# Patient Record
Sex: FEMALE | URBAN - METROPOLITAN AREA
[De-identification: names, ages, dates, MRNs, and addresses within clinical notes are randomized per-mention and may not be internally consistent; named-entity substitution may affect disease eponyms.]

---

## 2017-01-20 LAB
ABO + RH BLD: NORMAL
ABO + RH BLD: NORMAL
BLD GP AB SCN SERPL QL: NEGATIVE
HBV SURFACE AG SERPL QL IA: NEGATIVE
RUBELLA ABY IGG: 1.32
RUBELLA ANTIBODY IGG QUANTITATIVE: NORMAL IU/ML
T PALLIDUM IGG SER QL: NEGATIVE

## 2017-06-22 ENCOUNTER — CARE COORDINATION (OUTPATIENT)
Dept: CASE MANAGEMENT | Facility: CLINIC | Age: 37
End: 2017-06-22

## 2017-08-07 LAB — GROUP B STREP PCR: NEGATIVE

## 2017-08-21 ENCOUNTER — HOSPITAL ENCOUNTER (INPATIENT)
Facility: CLINIC | Age: 37
LOS: 3 days | Discharge: HOME | End: 2017-08-24
Attending: OBSTETRICS & GYNECOLOGY | Admitting: OBSTETRICS & GYNECOLOGY
Payer: MEDICAID

## 2017-08-21 LAB
A1 MICROGLOB PLACENTAL VAG QL: POSITIVE
ABO + RH BLD: NORMAL
ABO + RH BLD: NORMAL
GLUCOSE BLDC GLUCOMTR-MCNC: 74 MG/DL (ref 70–99)
GLUCOSE BLDC GLUCOMTR-MCNC: 83 MG/DL (ref 70–99)
GLUCOSE SERPL-MCNC: 73 MG/DL (ref 70–99)
SPECIMEN EXP DATE BLD: NORMAL

## 2017-08-21 PROCEDURE — 25000128 H RX IP 250 OP 636: Performed by: OBSTETRICS & GYNECOLOGY

## 2017-08-21 PROCEDURE — 25000132 ZZH RX MED GY IP 250 OP 250 PS 637: Performed by: OBSTETRICS & GYNECOLOGY

## 2017-08-21 PROCEDURE — 99215 OFFICE O/P EST HI 40 MIN: CPT | Mod: 25

## 2017-08-21 PROCEDURE — 86901 BLOOD TYPING SEROLOGIC RH(D): CPT | Performed by: OBSTETRICS & GYNECOLOGY

## 2017-08-21 PROCEDURE — 12000029 ZZH R&B OB INTERMEDIATE

## 2017-08-21 PROCEDURE — 25000125 ZZHC RX 250: Performed by: OBSTETRICS & GYNECOLOGY

## 2017-08-21 PROCEDURE — 3E033VJ INTRODUCTION OF OTHER HORMONE INTO PERIPHERAL VEIN, PERCUTANEOUS APPROACH: ICD-10-PCS | Performed by: OBSTETRICS & GYNECOLOGY

## 2017-08-21 PROCEDURE — 86780 TREPONEMA PALLIDUM: CPT | Performed by: OBSTETRICS & GYNECOLOGY

## 2017-08-21 PROCEDURE — 36415 COLL VENOUS BLD VENIPUNCTURE: CPT | Performed by: OBSTETRICS & GYNECOLOGY

## 2017-08-21 PROCEDURE — 82947 ASSAY GLUCOSE BLOOD QUANT: CPT | Performed by: OBSTETRICS & GYNECOLOGY

## 2017-08-21 PROCEDURE — 59025 FETAL NON-STRESS TEST: CPT

## 2017-08-21 PROCEDURE — 86900 BLOOD TYPING SEROLOGIC ABO: CPT | Performed by: OBSTETRICS & GYNECOLOGY

## 2017-08-21 PROCEDURE — 00000146 ZZHCL STATISTIC GLUCOSE BY METER IP

## 2017-08-21 PROCEDURE — 84112 EVAL AMNIOTIC FLUID PROTEIN: CPT | Performed by: OBSTETRICS & GYNECOLOGY

## 2017-08-21 RX ORDER — ACETAMINOPHEN 325 MG/1
650 TABLET ORAL EVERY 4 HOURS PRN
Status: DISCONTINUED | OUTPATIENT
Start: 2017-08-21 | End: 2017-08-22

## 2017-08-21 RX ORDER — NALOXONE HYDROCHLORIDE 0.4 MG/ML
.1-.4 INJECTION, SOLUTION INTRAMUSCULAR; INTRAVENOUS; SUBCUTANEOUS
Status: DISCONTINUED | OUTPATIENT
Start: 2017-08-21 | End: 2017-08-22

## 2017-08-21 RX ORDER — FENTANYL CITRATE 50 UG/ML
50-100 INJECTION, SOLUTION INTRAMUSCULAR; INTRAVENOUS
Status: DISCONTINUED | OUTPATIENT
Start: 2017-08-21 | End: 2017-08-22

## 2017-08-21 RX ORDER — PRENATAL VIT/IRON FUM/FOLIC AC 27MG-0.8MG
1 TABLET ORAL DAILY
COMMUNITY

## 2017-08-21 RX ORDER — OXYTOCIN 10 [USP'U]/ML
10 INJECTION, SOLUTION INTRAMUSCULAR; INTRAVENOUS
Status: DISCONTINUED | OUTPATIENT
Start: 2017-08-21 | End: 2017-08-22

## 2017-08-21 RX ORDER — DEXTROSE, SODIUM CHLORIDE, SODIUM LACTATE, POTASSIUM CHLORIDE, AND CALCIUM CHLORIDE 5; .6; .31; .03; .02 G/100ML; G/100ML; G/100ML; G/100ML; G/100ML
INJECTION, SOLUTION INTRAVENOUS CONTINUOUS
Status: DISCONTINUED | OUTPATIENT
Start: 2017-08-21 | End: 2017-08-22

## 2017-08-21 RX ORDER — METHYLERGONOVINE MALEATE 0.2 MG/ML
200 INJECTION INTRAVENOUS
Status: COMPLETED | OUTPATIENT
Start: 2017-08-21 | End: 2017-08-22

## 2017-08-21 RX ORDER — ONDANSETRON 2 MG/ML
4 INJECTION INTRAMUSCULAR; INTRAVENOUS EVERY 6 HOURS PRN
Status: DISCONTINUED | OUTPATIENT
Start: 2017-08-21 | End: 2017-08-22

## 2017-08-21 RX ORDER — NICOTINE POLACRILEX 4 MG
15-30 LOZENGE BUCCAL
Status: DISCONTINUED | OUTPATIENT
Start: 2017-08-21 | End: 2017-08-22

## 2017-08-21 RX ORDER — OXYCODONE AND ACETAMINOPHEN 5; 325 MG/1; MG/1
1 TABLET ORAL
Status: DISCONTINUED | OUTPATIENT
Start: 2017-08-21 | End: 2017-08-22

## 2017-08-21 RX ORDER — HYDROXYZINE HYDROCHLORIDE 50 MG/1
50-100 TABLET, FILM COATED ORAL EVERY 6 HOURS PRN
Status: DISCONTINUED | OUTPATIENT
Start: 2017-08-21 | End: 2017-08-22

## 2017-08-21 RX ORDER — SODIUM CHLORIDE 9 MG/ML
INJECTION, SOLUTION INTRAVENOUS CONTINUOUS
Status: DISCONTINUED | OUTPATIENT
Start: 2017-08-21 | End: 2017-08-22

## 2017-08-21 RX ORDER — OXYTOCIN/0.9 % SODIUM CHLORIDE 30/500 ML
1-24 PLASTIC BAG, INJECTION (ML) INTRAVENOUS CONTINUOUS
Status: DISCONTINUED | OUTPATIENT
Start: 2017-08-21 | End: 2017-08-22

## 2017-08-21 RX ORDER — HYDROXYZINE HYDROCHLORIDE 25 MG/1
25 TABLET, FILM COATED ORAL EVERY 6 HOURS PRN
Status: DISCONTINUED | OUTPATIENT
Start: 2017-08-21 | End: 2017-08-21

## 2017-08-21 RX ORDER — CARBOPROST TROMETHAMINE 250 UG/ML
250 INJECTION, SOLUTION INTRAMUSCULAR
Status: DISCONTINUED | OUTPATIENT
Start: 2017-08-21 | End: 2017-08-22

## 2017-08-21 RX ORDER — IBUPROFEN 800 MG/1
800 TABLET, FILM COATED ORAL
Status: DISCONTINUED | OUTPATIENT
Start: 2017-08-21 | End: 2017-08-22

## 2017-08-21 RX ORDER — OXYTOCIN/0.9 % SODIUM CHLORIDE 30/500 ML
100-340 PLASTIC BAG, INJECTION (ML) INTRAVENOUS CONTINUOUS PRN
Status: COMPLETED | OUTPATIENT
Start: 2017-08-21 | End: 2017-08-22

## 2017-08-21 RX ORDER — DEXTROSE MONOHYDRATE 25 G/50ML
25-50 INJECTION, SOLUTION INTRAVENOUS
Status: DISCONTINUED | OUTPATIENT
Start: 2017-08-21 | End: 2017-08-22

## 2017-08-21 RX ORDER — SODIUM CHLORIDE, SODIUM LACTATE, POTASSIUM CHLORIDE, CALCIUM CHLORIDE 600; 310; 30; 20 MG/100ML; MG/100ML; MG/100ML; MG/100ML
INJECTION, SOLUTION INTRAVENOUS CONTINUOUS
Status: DISCONTINUED | OUTPATIENT
Start: 2017-08-21 | End: 2017-08-22

## 2017-08-21 RX ADMIN — SODIUM CHLORIDE, POTASSIUM CHLORIDE, SODIUM LACTATE AND CALCIUM CHLORIDE: 600; 310; 30; 20 INJECTION, SOLUTION INTRAVENOUS at 12:51

## 2017-08-21 RX ADMIN — OXYTOCIN-SODIUM CHLORIDE 0.9% IV SOLN 30 UNIT/500ML 2 MILLI-UNITS/MIN: 30-0.9/5 SOLUTION at 12:52

## 2017-08-21 RX ADMIN — FENTANYL CITRATE 100 MCG: 50 INJECTION, SOLUTION INTRAMUSCULAR; INTRAVENOUS at 23:13

## 2017-08-21 RX ADMIN — HYDROXYZINE HYDROCHLORIDE 100 MG: 50 TABLET, FILM COATED ORAL at 21:50

## 2017-08-21 NOTE — PLAN OF CARE
1220-Dr. Flores at bedside and performed SVE, closed/60/-1. Updated on FHT's strip, moderate variability with accelerations. Discussed plan of care with Pt, plan is to begin oxytocin infusion to induce labor due to leaking membranes. 1252-Oxytocin infusion started. Will continue to monitor.

## 2017-08-21 NOTE — IP AVS SNAPSHOT
MRN:[id number]                      After Visit Summary   8/21/2017    Dot Rahman    MRN: [id number]           Thank you!     Thank you for choosing [facility] for your care. Our goal is always to provide you with excellent care. Hearing back from our patients is one way we can continue to improve our services. Please take a few minutes to complete the written survey that you may receive in the mail after you visit with us. Thank you!        Patient Information     Date Of Birth          1980        About your hospital stay     You were admitted on:  August 21, 2017 You last received care in the:  [address]    You were discharged on:  August 24, 2017        Reason for your hospital stay       Vaginal delivery                  Who to Call     For medical emergencies, please call 911.  For non-urgent questions about your medical care, please call your primary care provider or clinic, None          Attending Provider     Provider Specialty    León Flores MD OB/Gyn    Becky Calhoun MD OB/Gyn       Primary Care Provider    None Specified      After Care Instructions     Activity       Review discharge instructions            Activity       Your activity upon discharge: nothing per vagina x 6 weeks.            Diet       Resume previous diet            Diet       Follow this diet upon discharge: regular            Discharge Instructions           Discharge Instructions - Gestational diabetic patients       Gestational diabetic patients to follow up for fasting blood sugar and 2 hour 75gm glucose load at 6 weeks postpartum.            Discharge Instructions - Postpartum visit       Schedule postpartum visit with your provider and return to clinic in 6 weeks.                  Follow-up Appointments     Follow-up and recommended labs and tests        6 weeks postpartum, [facility]                  Further instructions from your care team        Postpartum Vaginal Delivery Instructions    Activity       Ask family and friends for help when you need it.    Do not place anything in your vagina for 6 weeks.    You are not restricted on other activities, but take it easy for a few weeks to allow your body to recover from delivery.  You are able to do any activities you feel up to that point.    No driving until you have stopped taking your pain medications (usually two weeks after delivery).     Call your health care provider if you have any of these symptoms:       Increased pain, swelling, redness, or fluid around your stiches from an episiotomy or perineal tear.    A fever above 100.4 F (38 C) with or without chills when placing a thermometer under your tongue.    You soak a sanitary pad with blood within 1 hour, or you see blood clots larger than a golf ball.    Bleeding that lasts more than 6 weeks.    Vaginal discharge that smells bad.    Severe pain, cramping or tenderness in your lower belly area.    A need to urinate more frequently (use the toilet more often), more urgently (use the toilet very quickly), or it burns when you urinate.    Nausea and vomiting.    Redness, swelling or pain around a vein in your leg.    Problems breastfeeding or a red or painful area on your breast.    Chest pain and cough or are gasping for air.    Problems coping with sadness, anxiety, or depression.  If you have any concerns about hurting yourself or the baby, call your provider immediately.     You have questions or concerns after you return home.     Keep your hands clean:  Always wash your hands before touching your perineal area and stitches.  This helps reduce your risk of infection.  If your hands aren't dirty, you may use an alcohol hand-rub to clean your hands. Keep your nails clean and short.        Pending Results     Date and Time Order Name Status Description    8/22/2017 2008 Placenta Path Order and Indications (PLACENTA) In process            "  Admission Information     Date & Time Provider Department Dept. Phone    2017 Becky Calhoun MD [address] [phone]      Your Vitals Were     Blood Pressure Pulse Temperature Respirations Height Weight    92/60 (BP Location: Right arm) 93 97.7  F (36.5  C) (Oral) 16 1.575 m (5' 2\") 73.5 kg (162 lb)    Pulse Oximetry BMI (Body Mass Index)                99% 29.63 kg/m2          [employer] Information     [employer] lets you send messages to your doctor, view your test results, renew your prescriptions, schedule appointments and more. To sign up, go to [email or web address] . Click on \"Log in\" on the left side of the screen, which will take you to the Welcome page. Then click on \"Sign up Now\" on the right side of the page.     You will be asked to enter the access code listed below, as well as some personal information. Please follow the directions to create your username and password.     Your access code is: [email or web address]  Expires: 2017  1:19 PM     Your access code will  in 90 days. If you need help or a new code, please call your [facility] clinic or [phone].        Care EveryWhere ID     This is your Care EveryWhere ID. This could be used by other organizations to access your [facility] medical records  [id number]        Equal Access to Services     JOCE MOELLER AH: Hadii cristian carvalho hadasho Sojosefali, waaxda luqadaha, qaybta kaalmada adeegyada, pipe lord . So [facility] [place] [phone].    ATENCIÓN: Si habla español, tiene a pena disposición servicios gratuitos de asistencia lingüística. Erwin manzano [phone].    We comply with applicable federal civil rights laws and [place] laws. We do not discriminate on the basis of race, color, national origin, age, disability sex, sexual orientation or gender identity.               Review of your medicines      START taking        Dose / Directions    acetaminophen 325 MG tablet   Commonly known as:  " TYLENOL        Dose:  650 mg   Take 2 tablets (650 mg) by mouth every 4 hours as needed for mild pain or fever (greater than or equal to 38? C /100.4? F (oral) or 38.5? C/ 101.4? F (core).)   Quantity:  100 tablet   Refills:  0       ibuprofen 400 MG tablet   Commonly known as:  ADVIL/MOTRIN        Dose:  400-800 mg   Take 1-2 tablets (400-800 mg) by mouth every 6 hours as needed for other (cramping)   Quantity:  120 tablet   Refills:  0       senna-docusate 8.6-50 MG per tablet   Commonly known as:  SENOKOT-S;PERICOLACE        Dose:  1-2 tablet   Take 1-2 tablets by mouth 2 times daily   Quantity:  100 tablet   Refills:  0         CONTINUE these medicines which have NOT CHANGED        Dose / Directions    prenatal multivitamin plus iron 27-0.8 MG Tabs per tablet        Dose:  1 tablet   Take 1 tablet by mouth daily   Refills:  0            Where to get your medicines      Some of these will need a paper prescription and others can be bought over the counter. Ask your nurse if you have questions.     You don't need a prescription for these medications     acetaminophen 325 MG tablet    ibuprofen 400 MG tablet    senna-docusate 8.6-50 MG per tablet                Protect others around you: Learn how to safely use, store and throw away your medicines at www.disposemymeds.org.             Medication List: This is a list of all your medications and when to take them. Check marks below indicate your daily home schedule. Keep this list as a reference.      Medications           Morning Afternoon Evening Bedtime As Needed    acetaminophen 325 MG tablet   Commonly known as:  TYLENOL   Take 2 tablets (650 mg) by mouth every 4 hours as needed for mild pain or fever (greater than or equal to 38? C /100.4? F (oral) or 38.5? C/ 101.4? F (core).)   Last time this was given:  650 mg on 8/24/2017  8:30 AM                                ibuprofen 400 MG tablet   Commonly known as:  ADVIL/MOTRIN   Take 1-2 tablets (400-800 mg) by  mouth every 6 hours as needed for other (cramping)   Last time this was given:  800 mg on 8/24/2017  8:30 AM                                prenatal multivitamin plus iron 27-0.8 MG Tabs per tablet   Take 1 tablet by mouth daily                                senna-docusate 8.6-50 MG per tablet   Commonly known as:  SENOKOT-S;PERICOLACE   Take 1-2 tablets by mouth 2 times daily   Last time this was given:  1 tablet on 8/24/2017  8:30 AM

## 2017-08-21 NOTE — IP AVS SNAPSHOT
[address]    [place] [address]    Phone:  [phone]                                       After Visit Summary   8/21/2017    Dot Rahman    MRN: [id number]           After Visit Summary Signature Page     I have received my discharge instructions, and my questions have been answered. I have discussed any challenges I see with this plan with the nurse or doctor.    ..........................................................................................................................................  Patient/Patient Representative Signature      ..........................................................................................................................................  Patient Representative Print Name and Relationship to Patient    ..................................................               ................................................  Date                                            Time    ..........................................................................................................................................  Reviewed by Signature/Title    ...................................................              ..............................................  Date                                                            Time

## 2017-08-21 NOTE — H&P
The patient is a 36 yo  with VANGIE of 9/3/17 who is admitted at 38 1/7 weeks with PROM.    She has been followed at [facility] since .    Prenatal labs Blood type A positive, Rosa screen neg. RPR, Hep C, Hep B, HIV all neg. Rubella immune, plt xount 328 K, TSH 3.51, vit D 14.5. GC/chlamydia neg.     Ultrasound 17 7 3/7. VANGIE 9/3/17.  Normal NT 17 12 6/7 wk  Ultrasound 17 normal 20 5/7wk US.  Gluc ose screen 16 163, failed 3 hr GTTon . Followed at Endocrinology, GDM diet controled.  Good blood sugars.  17 Tdap given    GBS negative 17    Ultrasound done 17 6# 8 oz, 71%.     Followed with NSTs.    Last visit 17 cx FT/50/-3    Had SROM clear fluid at 0520 today.  Seen at [facility], positive amnisure.    Patient has been having mild contractions, now every 5 minutes.  She has wanted to wait for augmentation of labor.  Trying to avoid epidural also if possible.    I had offered to start pitocin earlier this morning, has declined but agreed to pitocin if no active labor by noon.    Exam: cx essentially closed, 60%, 0/-1.    Category 1 tracing    IMP: IUP 38 1/7  PROM now for 7 hours  Diet controlled GDM  GBS neg    Patient agrees to pitocin augmentation of labor.  Epidural or nitrous at pt request.

## 2017-08-21 NOTE — PLAN OF CARE
Received patient from home for evaluation of SROM at ~0520 this morning.   at 38w1d gestation.  Pregnancy complicated by AMA, anemia, and GDDC. Patient denies vaginal bleeding and states uterine contractions are mild. Discussed plan of care to include Amnisure.  Verbal consent obtained.  Monitors placed.  Admission database completed.  Amnisure obtained and sent to lab at 0815.  Call light within reach, water provided, [place] at beside.    0855 Amnisure +.  Dr. Flores paged and updated on above, +Amnisure, cervical exam, uterine activity, and fetal tracing.  Orders received.  Patient updated on plan of care, questions answered regarding Pitocin and pain management.  Verbal consent obtained.    0910 Patient to Room 219, oriented to unit, room , and call light.  Report given to SANTANA Fragoso RN who will assume care of patient.  Dr. Flores will be in to see patient later this afternoon.

## 2017-08-21 NOTE — PLAN OF CARE
Pt admitted to room 219 for SROM. Plan to induce with Pitocin at 1200 if no cervical change. Pt oriented to room, external monitors and call light. Verbal consent obtained and monitors applied at 1005. FHT's with moderate variability and accelerations. Owen q4-6mins but not feeling them. Pt up to ambulate in hallways.  remains supportive at bedside. Will continue to monitor.

## 2017-08-22 ENCOUNTER — ANESTHESIA (OUTPATIENT)
Dept: OBGYN | Facility: CLINIC | Age: 37
End: 2017-08-22
Payer: MEDICAID

## 2017-08-22 ENCOUNTER — ANESTHESIA EVENT (OUTPATIENT)
Dept: OBGYN | Facility: CLINIC | Age: 37
End: 2017-08-22
Payer: MEDICAID

## 2017-08-22 LAB
GLUCOSE BLDC GLUCOMTR-MCNC: 113 MG/DL (ref 70–99)
GLUCOSE BLDC GLUCOMTR-MCNC: 60 MG/DL (ref 70–99)
GLUCOSE BLDC GLUCOMTR-MCNC: 70 MG/DL (ref 70–99)
GLUCOSE BLDC GLUCOMTR-MCNC: 73 MG/DL (ref 70–99)
GLUCOSE BLDC GLUCOMTR-MCNC: 89 MG/DL (ref 70–99)
KETONES BLD-SCNC: 1.6 MMOL/L (ref 0–0.6)
T PALLIDUM IGG+IGM SER QL: NEGATIVE

## 2017-08-22 PROCEDURE — 12000037 ZZH R&B POSTPARTUM INTERMEDIATE

## 2017-08-22 PROCEDURE — 25000128 H RX IP 250 OP 636: Performed by: OBSTETRICS & GYNECOLOGY

## 2017-08-22 PROCEDURE — 82010 KETONE BODYS QUAN: CPT | Performed by: OBSTETRICS & GYNECOLOGY

## 2017-08-22 PROCEDURE — 88307 TISSUE EXAM BY PATHOLOGIST: CPT | Mod: 26 | Performed by: OBSTETRICS & GYNECOLOGY

## 2017-08-22 PROCEDURE — 37000011 ZZH ANESTHESIA WARD SERVICE

## 2017-08-22 PROCEDURE — 25000125 ZZHC RX 250: Performed by: OBSTETRICS & GYNECOLOGY

## 2017-08-22 PROCEDURE — 00HU33Z INSERTION OF INFUSION DEVICE INTO SPINAL CANAL, PERCUTANEOUS APPROACH: ICD-10-PCS | Performed by: ANESTHESIOLOGY

## 2017-08-22 PROCEDURE — 36415 COLL VENOUS BLD VENIPUNCTURE: CPT | Performed by: OBSTETRICS & GYNECOLOGY

## 2017-08-22 PROCEDURE — 00000146 ZZHCL STATISTIC GLUCOSE BY METER IP

## 2017-08-22 PROCEDURE — 25000125 ZZHC RX 250: Performed by: ANESTHESIOLOGY

## 2017-08-22 PROCEDURE — 88307 TISSUE EXAM BY PATHOLOGIST: CPT | Performed by: OBSTETRICS & GYNECOLOGY

## 2017-08-22 PROCEDURE — 3E0R3CZ INTRODUCTION OF REGIONAL ANESTHETIC INTO SPINAL CANAL, PERCUTANEOUS APPROACH: ICD-10-PCS | Performed by: ANESTHESIOLOGY

## 2017-08-22 PROCEDURE — 72200001 ZZH LABOR CARE VAGINAL DELIVERY SINGLE

## 2017-08-22 PROCEDURE — S0020 INJECTION, BUPIVICAINE HYDRO: HCPCS | Performed by: ANESTHESIOLOGY

## 2017-08-22 PROCEDURE — 0KQM0ZZ REPAIR PERINEUM MUSCLE, OPEN APPROACH: ICD-10-PCS | Performed by: OBSTETRICS & GYNECOLOGY

## 2017-08-22 PROCEDURE — 25000128 H RX IP 250 OP 636: Performed by: ANESTHESIOLOGY

## 2017-08-22 PROCEDURE — 25000132 ZZH RX MED GY IP 250 OP 250 PS 637: Performed by: OBSTETRICS & GYNECOLOGY

## 2017-08-22 RX ORDER — LANOLIN 100 %
OINTMENT (GRAM) TOPICAL
Status: DISCONTINUED | OUTPATIENT
Start: 2017-08-22 | End: 2017-08-24 | Stop reason: HOSPADM

## 2017-08-22 RX ORDER — EPHEDRINE SULFATE 50 MG/ML
5 INJECTION, SOLUTION INTRAMUSCULAR; INTRAVENOUS; SUBCUTANEOUS
Status: DISCONTINUED | OUTPATIENT
Start: 2017-08-22 | End: 2017-08-22

## 2017-08-22 RX ORDER — BUPIVACAINE HYDROCHLORIDE 2.5 MG/ML
20 INJECTION, SOLUTION EPIDURAL; INFILTRATION; INTRACAUDAL ONCE
Status: COMPLETED | OUTPATIENT
Start: 2017-08-22 | End: 2017-08-22

## 2017-08-22 RX ORDER — HYDROCORTISONE 2.5 %
CREAM (GRAM) TOPICAL 3 TIMES DAILY PRN
Status: DISCONTINUED | OUTPATIENT
Start: 2017-08-22 | End: 2017-08-24 | Stop reason: HOSPADM

## 2017-08-22 RX ORDER — FENTANYL CITRATE 50 UG/ML
100 INJECTION, SOLUTION INTRAMUSCULAR; INTRAVENOUS ONCE
Status: COMPLETED | OUTPATIENT
Start: 2017-08-22 | End: 2017-08-22

## 2017-08-22 RX ORDER — NALOXONE HYDROCHLORIDE 0.4 MG/ML
.1-.4 INJECTION, SOLUTION INTRAMUSCULAR; INTRAVENOUS; SUBCUTANEOUS
Status: DISCONTINUED | OUTPATIENT
Start: 2017-08-22 | End: 2017-08-24 | Stop reason: HOSPADM

## 2017-08-22 RX ORDER — OXYTOCIN/0.9 % SODIUM CHLORIDE 30/500 ML
340 PLASTIC BAG, INJECTION (ML) INTRAVENOUS CONTINUOUS PRN
Status: DISCONTINUED | OUTPATIENT
Start: 2017-08-22 | End: 2017-08-24 | Stop reason: HOSPADM

## 2017-08-22 RX ORDER — OXYTOCIN 10 [USP'U]/ML
10 INJECTION, SOLUTION INTRAMUSCULAR; INTRAVENOUS
Status: DISCONTINUED | OUTPATIENT
Start: 2017-08-22 | End: 2017-08-24 | Stop reason: HOSPADM

## 2017-08-22 RX ORDER — OXYTOCIN/0.9 % SODIUM CHLORIDE 30/500 ML
100 PLASTIC BAG, INJECTION (ML) INTRAVENOUS CONTINUOUS
Status: DISCONTINUED | OUTPATIENT
Start: 2017-08-22 | End: 2017-08-24 | Stop reason: HOSPADM

## 2017-08-22 RX ORDER — NALOXONE HYDROCHLORIDE 0.4 MG/ML
.1-.4 INJECTION, SOLUTION INTRAMUSCULAR; INTRAVENOUS; SUBCUTANEOUS
Status: DISCONTINUED | OUTPATIENT
Start: 2017-08-22 | End: 2017-08-22

## 2017-08-22 RX ORDER — OXYCODONE HYDROCHLORIDE 5 MG/1
5-10 TABLET ORAL
Status: DISCONTINUED | OUTPATIENT
Start: 2017-08-22 | End: 2017-08-24 | Stop reason: HOSPADM

## 2017-08-22 RX ORDER — AMOXICILLIN 250 MG
1-2 CAPSULE ORAL 2 TIMES DAILY
Status: DISCONTINUED | OUTPATIENT
Start: 2017-08-22 | End: 2017-08-24 | Stop reason: HOSPADM

## 2017-08-22 RX ORDER — ACETAMINOPHEN 325 MG/1
650 TABLET ORAL EVERY 4 HOURS PRN
Status: DISCONTINUED | OUTPATIENT
Start: 2017-08-22 | End: 2017-08-24 | Stop reason: HOSPADM

## 2017-08-22 RX ORDER — AMPICILLIN 2 G/1
2 INJECTION, POWDER, FOR SOLUTION INTRAVENOUS EVERY 6 HOURS
Status: DISCONTINUED | OUTPATIENT
Start: 2017-08-22 | End: 2017-08-22

## 2017-08-22 RX ORDER — MISOPROSTOL 200 UG/1
400 TABLET ORAL
Status: DISCONTINUED | OUTPATIENT
Start: 2017-08-22 | End: 2017-08-24 | Stop reason: HOSPADM

## 2017-08-22 RX ORDER — IBUPROFEN 400 MG/1
400-800 TABLET, FILM COATED ORAL EVERY 6 HOURS PRN
Status: DISCONTINUED | OUTPATIENT
Start: 2017-08-22 | End: 2017-08-24 | Stop reason: HOSPADM

## 2017-08-22 RX ORDER — NALBUPHINE HYDROCHLORIDE 10 MG/ML
2.5-5 INJECTION, SOLUTION INTRAMUSCULAR; INTRAVENOUS; SUBCUTANEOUS EVERY 6 HOURS PRN
Status: DISCONTINUED | OUTPATIENT
Start: 2017-08-22 | End: 2017-08-22

## 2017-08-22 RX ORDER — ROPIVACAINE HYDROCHLORIDE 2 MG/ML
10 INJECTION, SOLUTION EPIDURAL; INFILTRATION; PERINEURAL ONCE
Status: COMPLETED | OUTPATIENT
Start: 2017-08-22 | End: 2017-08-22

## 2017-08-22 RX ORDER — BISACODYL 10 MG
10 SUPPOSITORY, RECTAL RECTAL DAILY PRN
Status: DISCONTINUED | OUTPATIENT
Start: 2017-08-24 | End: 2017-08-24 | Stop reason: HOSPADM

## 2017-08-22 RX ORDER — AMPICILLIN 2 G/1
2 INJECTION, POWDER, FOR SOLUTION INTRAVENOUS EVERY 6 HOURS
Status: COMPLETED | OUTPATIENT
Start: 2017-08-22 | End: 2017-08-23

## 2017-08-22 RX ORDER — BUPIVACAINE HYDROCHLORIDE 2.5 MG/ML
INJECTION, SOLUTION EPIDURAL; INFILTRATION; INTRACAUDAL
Status: DISCONTINUED
Start: 2017-08-22 | End: 2017-08-22 | Stop reason: HOSPADM

## 2017-08-22 RX ADMIN — SODIUM CHLORIDE, POTASSIUM CHLORIDE, SODIUM LACTATE AND CALCIUM CHLORIDE: 600; 310; 30; 20 INJECTION, SOLUTION INTRAVENOUS at 05:55

## 2017-08-22 RX ADMIN — Medication 5 MG: at 06:18

## 2017-08-22 RX ADMIN — BUPIVACAINE HYDROCHLORIDE 8 ML: 2.5 INJECTION, SOLUTION EPIDURAL; INFILTRATION; INTRACAUDAL at 14:51

## 2017-08-22 RX ADMIN — FENTANYL CITRATE 100 MCG: 50 INJECTION, SOLUTION INTRAMUSCULAR; INTRAVENOUS at 06:03

## 2017-08-22 RX ADMIN — GENTAMICIN SULFATE 90 MG: 40 INJECTION, SOLUTION INTRAMUSCULAR; INTRAVENOUS at 15:38

## 2017-08-22 RX ADMIN — ACETAMINOPHEN 650 MG: 325 TABLET, FILM COATED ORAL at 16:12

## 2017-08-22 RX ADMIN — AMPICILLIN SODIUM 2 G: 2 INJECTION, POWDER, FOR SOLUTION INTRAMUSCULAR; INTRAVENOUS at 20:54

## 2017-08-22 RX ADMIN — ACETAMINOPHEN 650 MG: 325 TABLET, FILM COATED ORAL at 20:51

## 2017-08-22 RX ADMIN — FENTANYL CITRATE 100 MCG: 50 INJECTION, SOLUTION INTRAMUSCULAR; INTRAVENOUS at 03:53

## 2017-08-22 RX ADMIN — AMPICILLIN SODIUM 2 G: 2 INJECTION, POWDER, FOR SOLUTION INTRAMUSCULAR; INTRAVENOUS at 08:00

## 2017-08-22 RX ADMIN — LIDOCAINE HYDROCHLORIDE 20 ML: 10 INJECTION, SOLUTION INFILTRATION; PERINEURAL at 19:09

## 2017-08-22 RX ADMIN — Medication 5 MG: at 06:21

## 2017-08-22 RX ADMIN — OXYTOCIN-SODIUM CHLORIDE 0.9% IV SOLN 30 UNIT/500ML 340 ML/HR: 30-0.9/5 SOLUTION at 19:05

## 2017-08-22 RX ADMIN — IBUPROFEN 800 MG: 400 TABLET ORAL at 20:51

## 2017-08-22 RX ADMIN — ROPIVACAINE HYDROCHLORIDE 5 ML: 2 INJECTION, SOLUTION EPIDURAL; INFILTRATION at 06:04

## 2017-08-22 RX ADMIN — ROPIVACAINE HYDROCHLORIDE 5 ML: 2 INJECTION, SOLUTION EPIDURAL; INFILTRATION at 06:02

## 2017-08-22 RX ADMIN — Medication 12 ML/HR: at 06:08

## 2017-08-22 RX ADMIN — SODIUM CHLORIDE, POTASSIUM CHLORIDE, SODIUM LACTATE AND CALCIUM CHLORIDE 1000 ML: 600; 310; 30; 20 INJECTION, SOLUTION INTRAVENOUS at 05:05

## 2017-08-22 RX ADMIN — FENTANYL CITRATE 100 MCG: 50 INJECTION, SOLUTION INTRAMUSCULAR; INTRAVENOUS at 01:36

## 2017-08-22 RX ADMIN — AMPICILLIN SODIUM 2 G: 2 INJECTION, POWDER, FOR SOLUTION INTRAMUSCULAR; INTRAVENOUS at 13:57

## 2017-08-22 RX ADMIN — GENTAMICIN SULFATE 90 MG: 40 INJECTION, SOLUTION INTRAMUSCULAR; INTRAVENOUS at 23:33

## 2017-08-22 RX ADMIN — METHYLERGONOVINE MALEATE 200 MCG: 0.2 INJECTION INTRAVENOUS at 19:07

## 2017-08-22 NOTE — PROVIDER NOTIFICATION
08/22/17 0640   Provider Notification   Provider Name/Title Dr. Calhoun   Method of Notification Phone   Request Evaluate - Remote   Notification Reason Status Update   Dr. Calhoun ordered Ampicillin for prolonged ROM. Dr. Bermudez to follow today.

## 2017-08-22 NOTE — PLAN OF CARE
0715 - Report received from YENIFER Dyer RN. Will assume care of patient at this time.   0800 - Dr. Bermudez at bedside. SVE 3.5/90/-2. Ampicillin started at this time per >24 hour rupture of membranes. Plan of care discussed with patient and significant other. Questions answered.   0818 - IUPC placed by Dr. Bermudez at this time.  0900 - Pitocin increased at this time. Patient resting comfortably. .   1100 - Patient recheck and no cervical change. Plan to continue to reposition and increase pitocin.   1300 - SVE 4/90/-1. BS 60 - patient requesting apple juice.   1311 - Dr. Bermudez updated via page about SVE, temperature of 100.6 and uterine activity.   1326 - Patient called RN into room. Feeling a little nauseated, but more just coughing. Patient also starting to feel uncomfortable in her left lower abdomen quadrant. LR bolus started for epidural rebolus.   1400 - Dr. Bermudez on phone. Updated on patient status (pain management, contractions and materal temperature). Orders received to start gentamicin and calling chorioamnionitis at this time. NICU and NNP made aware.   1435 - Lejunior MDA called to come back to bedside. Patient continues to feel left sided contractions and is breathing heavily through them.   1450 - Lejunior at bedside for epidural rebolus. Bupivacaine handed off to him at this time. BS - 73.   1515 - Dr. Bermudez at bedside. SVE 6.5/100/0. Talked to patient and significant other about plan of care and chorio. Questions answered at this time.   1525 - Report given to BHARGAVI Santos RN who will assume care of patient at this time.

## 2017-08-22 NOTE — PROGRESS NOTES
OB progress  (coverage assumed @ ~0730)    Comfortable with epidural    Afebrile  Normotensive    FHT Category  Moderate variability  Occasional mild variable decelerations    Oxytocin @ 10 mU/min  Contractions every 3-4 minutes    3+ / <0.5 cm long / vtx -1-2  forebag ruptured (@ ~0805); additional clear fluid released    A/p    38+ week nullipara  ROM > 24 hours  Undergoing oxytocin augmentation of labor  Epidural in place & working well  Afebrile without evidence of intra-amniotic infection  FHT reassuring    AROM forebag performed to augment progress  IUPC also placed to maximize efficiency of oxytocin administration  Pt now receiving IV antibiotics for prolonged ROM    Pt still in latent phase; awaiting transition to active phase    Will adjust oxytocin accordingly    Aidan ESPARZA

## 2017-08-22 NOTE — PLAN OF CARE
1920: SBAR report received from Lizet MARIE RN. Will assume all cares of this patient from this time.  2100: SVE 1/70/-1 Dr. Calhoun updated. Atarax order received.  2150: Atarax 100mg given po.  2313: Patient not relaxing after Atarax. Patient requesting IV pain medication. SVE 1.5/70/-1. Fentanyl 100 mcg IV push.  0000: Patient resting.  0136: Patient awake, uncomfortable, requesting IV pain medication. SVE 2/80/-1 Fentanyl 100 mcg IV push.  0215: Patient sleeping.  0350: SVE 2.5/80/0 Patient requesting pain medication. Fentanyl 100mcg IV push given.  0500: IV bolus started for epidural.  0620: Epidural in, patient placed in left tilt position.  0630: SVE 2.5/80/0 Logan placed. Dr. Calhoun updated.  0640: Dr. Calhoun called. Ampicillin ordered. Dr. Bermudez to follow today.  0720: SBAR report given to Karne LANCE RN

## 2017-08-22 NOTE — PROVIDER NOTIFICATION
08/21/17 2100   Provider Notification   Provider Name/Title Dr. Calhoun   Method of Notification Phone   Request Evaluate - Remote   Notification Reason Labor Status;Uterine Activity;Pain;SVE;Status Update

## 2017-08-22 NOTE — PROGRESS NOTES
Comfortable following epidural redose    Tm 101.3    FHT Category 2  Baseline ~160  Moderate variability  Occasional mild variable decelerations    Contractions ~80-90% adequate  Oxytocin @ 22 mU/min    6+ / C / 0+ / ROP    A/p    Active phase  Decent progress  Will continue with oxytocin (and increase as able); hope for descent & rotation of fetal vertex    Pt receiving IV ampicillin  IV gentamicin has been added; maternal fever suggestive of diagnosis of chorioamnionitis    Pt & spouse aware that  will likely require surveillance in [facility]    Fetal status currently reassuring    Aidan ESPARZA

## 2017-08-22 NOTE — ANESTHESIA PROCEDURE NOTES
Peripheral nerve/Neuraxial procedure note : epidural catheter  Pre-Procedure  Performed by BRYANNA MORGAN  Location: OB      Pre-Anesthestic Checklist: patient identified, IV checked, site marked, risks and benefits discussed, informed consent, monitors and equipment checked, pre-op evaluation, at physician/surgeon's request and post-op pain management    Timeout  Correct Patient: Yes   Correct Procedure: Yes   Correct Site: Yes   Correct Laterality: Yes   Correct Position: Yes   Site Marked: Yes   .   Procedure Documentation    Diagnosis:Labor Pain.    Procedure:    Epidural catheter.  Insertion Site:L2-3  (midline approach) Injection technique: LORT saline   Local skin infiltrated with 4 mL of 1% lidocaine.  JUAN at 5 cm     Patient Prep;mask, sterile gloves, povidone-iodine 7.5% surgical scrub, patient draped.  .  Needle: Touhy needle Needle Gauge: 17.    Needle Length (Inches) 3.5  # of attempts: 1 and  # of redirects:  1 .   Catheter: 19 G . .  Catheter threaded easily  4 cm epidural space.  9 cm at skin.   .    Assessment/Narrative  Paresthesias: No.  .  .  Aspiration negative for heme or CSF  . Test dose of 3 mL lidocaine 1.5% w/ 1:200,000 epinephrine at. Test dose negative for signs of intravascular, subdural or intrathecal injection. Comments:  Patient tolerated procedure well

## 2017-08-22 NOTE — PROVIDER NOTIFICATION
08/22/17 0630   Provider Notification   Provider Name/Title Dr. Calhoun   Method of Notification Phone   Request Evaluate - Remote   Notification Reason Labor Status;Uterine Activity;Pain;SVE;Status Update

## 2017-08-22 NOTE — PROGRESS NOTES
Feeling some pressure    FHT Category 2, reassuring    Contractions adequate; oxytocin @ 23 mU/min    RIM / C / +1+2 of 5 / ROT    >>>good progress    Anticipate 2nd stage soon    Aidan ESPARZA

## 2017-08-23 LAB
GLUCOSE BLDC GLUCOMTR-MCNC: 80 MG/DL (ref 70–99)
HGB BLD-MCNC: 9.1 G/DL (ref 11.7–15.7)

## 2017-08-23 PROCEDURE — 25000128 H RX IP 250 OP 636: Performed by: OBSTETRICS & GYNECOLOGY

## 2017-08-23 PROCEDURE — 36415 COLL VENOUS BLD VENIPUNCTURE: CPT | Performed by: OBSTETRICS & GYNECOLOGY

## 2017-08-23 PROCEDURE — 00000146 ZZHCL STATISTIC GLUCOSE BY METER IP

## 2017-08-23 PROCEDURE — 85018 HEMOGLOBIN: CPT | Performed by: OBSTETRICS & GYNECOLOGY

## 2017-08-23 PROCEDURE — 12000037 ZZH R&B POSTPARTUM INTERMEDIATE

## 2017-08-23 PROCEDURE — 25000132 ZZH RX MED GY IP 250 OP 250 PS 637: Performed by: OBSTETRICS & GYNECOLOGY

## 2017-08-23 RX ADMIN — ACETAMINOPHEN 650 MG: 325 TABLET, FILM COATED ORAL at 03:26

## 2017-08-23 RX ADMIN — AMPICILLIN SODIUM 2 G: 2 INJECTION, POWDER, FOR SOLUTION INTRAMUSCULAR; INTRAVENOUS at 03:16

## 2017-08-23 RX ADMIN — IBUPROFEN 800 MG: 400 TABLET ORAL at 14:49

## 2017-08-23 RX ADMIN — SENNOSIDES AND DOCUSATE SODIUM 2 TABLET: 8.6; 5 TABLET ORAL at 20:21

## 2017-08-23 RX ADMIN — ACETAMINOPHEN 650 MG: 325 TABLET, FILM COATED ORAL at 20:21

## 2017-08-23 RX ADMIN — IBUPROFEN 800 MG: 400 TABLET ORAL at 20:21

## 2017-08-23 RX ADMIN — GENTAMICIN SULFATE 90 MG: 40 INJECTION, SOLUTION INTRAMUSCULAR; INTRAVENOUS at 07:15

## 2017-08-23 RX ADMIN — ACETAMINOPHEN 650 MG: 325 TABLET, FILM COATED ORAL at 08:33

## 2017-08-23 RX ADMIN — IBUPROFEN 800 MG: 400 TABLET ORAL at 03:26

## 2017-08-23 RX ADMIN — IBUPROFEN 800 MG: 400 TABLET ORAL at 09:37

## 2017-08-23 RX ADMIN — SENNOSIDES AND DOCUSATE SODIUM 1 TABLET: 8.6; 5 TABLET ORAL at 08:33

## 2017-08-23 RX ADMIN — ACETAMINOPHEN 650 MG: 325 TABLET, FILM COATED ORAL at 14:49

## 2017-08-23 NOTE — PROVIDER NOTIFICATION
1750:  Pt feeling more perineal pressure.  Dr. Flores at bedside.  SVE by MD.  MD stated pt has slight ant lip and wants pt to start pushing.  :  Pt started pushing with contractions.  : Excellent pushing efforts.  Cervix complete. Station +1.   :  Logan removed. 800cc hernan urine.  :  IUPC removed.  Pt pushing well.  MD in department.  :  Dr. Flores at bedside to check progress.   Bed set up for delivery.  :  2nd RN called to room.  NNP notified to come for delivery.  :   viable baby girl.  Apgars 8/9.  Baby allowed to remain in room with parents for bonding period prior to admission to NICU.

## 2017-08-23 NOTE — PROGRESS NOTES
"C/o sore  Bottom  /63  Pulse 93  Temp 97.8  F (36.6  C) (Oral)  Resp 16  Ht 1.575 m (5' 2\")  Wt 73.5 kg (162 lb)  SpO2 99%  Breastfeeding? Unknown  BMI 29.63 kg/m2 Breastfeeding: yes  Tmax 102.1 treated for chorio; afebrile since delivery  Perineum: swollen, but soft, no evidence of hematoma  PPD #1: doing well.  Abx for chorio dc'd.  Pt afebrile.  "

## 2017-08-23 NOTE — PLAN OF CARE
Patient transferred to room 423 accompanied by Patrizia SIMPSON RN. Bedside report received. Last blood glucose was taken at 2102 and was 113, will check a fasting blood glucose in AM. Infant and dad in NICU. Oriented patient to room, call light, and plan of care. Will continue to monitor.

## 2017-08-23 NOTE — PLAN OF CARE
Problem: Goal Outcome Summary  Goal: Goal Outcome Summary  Outcome: Therapy, progress toward functional goals as expected   baby girl, admitted into NICU due to chorioamionitis during labor.  Attempted to breast feed.  Pt exhausted, but happy to be delivered.

## 2017-08-23 NOTE — PLAN OF CARE
Data: Dot Rahman transferred to [facility] via wheelchair at 2245. Baby remains in NICU.   Action: Receiving unit notified of transfer: Yes. Patient and family notified of room change. Report given to KAYLA Saleem RN at 2250. Belongings sent to receiving unit. Accompanied by Registered Nurse. Oriented patient to surroundings. Call light within reach. ID bands double-checked with receiving RN.  Response: Patient tolerated transfer and is stable.

## 2017-08-23 NOTE — PLAN OF CARE
Problem: Labor (Cervical Ripen, Induct, Augment) (Adult,Obstetrics,Pediatric)  Goal: Signs and Symptoms of Listed Potential Problems Will be Absent or Manageable (Labor)  Signs and symptoms of listed potential problems will be absent or manageable by discharge/transition of care (reference Labor (Cervical Ripen, Induct, Augment) (Adult,Obstetrics,Pediatric) CPG).   Outcome: Completed Date Met:  17  Pt treated with IV antibiotic(ampicillin) in labor due to prolonged ROM over 24 hrs.  Pt developed fever later in labor and gentamycin started.  -170, moderate variability, variable decels intermittently, + accels until pushing phase.  Pt diagnosed with chorioamnionitis.   viable female infant.  Baby allowed to bond with parents and attempt to breast feed prior to admission into NICU.

## 2017-08-23 NOTE — PLAN OF CARE
Problem: Goal Outcome Summary  Goal: Goal Outcome Summary  Outcome: Improving  Improving with pain and activity. Breastfeeding with assist to latch.

## 2017-08-23 NOTE — PROCEDURES
Delivery Note     female infant 7# 6 oz, Apgar 8 @ 1min, 9 @ 5min    Delayed cord clamping    Chorioamnionitis, NNP present    Epidural    Pitocin augmentation of labor max 23 mu/min    Second degree vaginal, perineal and peirurethral lacerations,repaired chromic    Placenta, spontaneous, intact, some atony, methergine o.2 IM.    EBL: 400 cc

## 2017-08-23 NOTE — PROGRESS NOTES
Initial Lactation visit.   Hand out given.  Recommend unlimited, frequent breast feedings:  At least 8 - 12 times every 24 hours.  Avoid pacifiers and supplementation with formula unless medically indicated    Explained benefits of holding baby skin on skin to help promote better breastfeeding outcomes. Baby arrived from NICU (ABX-chorio). Able to achieve good latch and vigorous, rhythmic suckle- audible swallow. Will continue to follow as needed. N Day RN IBCLC

## 2017-08-23 NOTE — L&D DELIVERY NOTE
Dot Rahman is a 37-year-old primigravida female with EDC of 9/3/2017.  Prenatal course was followed at [facility].  Blood type is A positive, antibody screen was negative.  VDRL was negative, hepatitis C, hepatitis B and HIV were all negative.  Rubella was immune, platelet count was 328,000.  TSH was 3.51.  Vitamin D was 14.5.  GC and chlamydia cultures were negative.      She had an ultrasound on 2017 at 7-3/7 weeks' gestation consistent with a due date of 9/3/2017.  She had a normal nuchal translucency and had a 20-week ultrasound on 2017 that was normal.  She failed a glucose screen on 2017 and failed her 3-hour glucose tolerance on 2017.  She was followed as a gestational diabetic did well with good control of her blood sugars.  She did receive her Tdap.  GBS culture was negative on 2017.  Her last ultrasound done on 2017 showed the baby to weigh 6 pounds 8 ounces, 71st percentile.      She was followed with nonstress tests.  She had a spontaneous rupture of membranes at 0520 on 2017.  She was having minimal contractions.  Cervix was essentially closed, 60%, -1 station.  She was started on Pitocin augmentation of labor.        She had a long Pitocin induction throughout the evening on 2017 and throughout the day on 2017.  She did receive an epidural.  She started making good progress in the afternoon.  She did have a temperature rise to 101.3 degrees, it was felt to have chorioamnionitis.  She was started on IV antibiotics per protocol.  She had been on ampicillin for prolonged rupture of membranes and gentamicin was added.      She continued to progress, went on to complete dilation.      The patient was allowed to push when complete.  She was a very good pusher was able to bring the vertex down steadily towards the perineum.  It was a category 2 tracing with good variability at all times with some variable decelerations.      She was  well, she  was prepped and draped in the usual manner.  She delivered the vertex in the occiput anterior position remainder of the infant delivered without difficulties and been placed on the maternal abdomen.   nurse practitioner was present.  Infant was a female infant weighing 7 pounds 6 ounces.  Apgars were 8 at 1 minute and 9 at 5 minutes.  Delayed cord clamping had been performed.      The placenta delivered spontaneously intact and she was started on intravenous Pitocin.  There was some uterine atony.  Continued massage was performed.  She received Methergine 0.2 mg IM.  The uterus did firm up nicely.      The vagina was inspected.  There were bilateral vaginal lacerations.  There was a right periurethral laceration.  All these areas were closed using 3-0 chromic suture in a layer to layer manner.  Perineum also a second-degree laceration that was repaired in this typical manner.  A few figure-of-X stitches were also necessary for complete hemostasis.  At the end of repair no vaginal hematomas good uterine tone.  Rectal exam showed good sphincter tone and the mucosa to be intact.  Estimated blood loss was 400 mL.  Both mother and infant were doing well at the end of delivery.         JOSE R GALVEZ MD             D: 2017 19:44   T: 2017 00:26   MT: [id number]      Name:     LOIS ESTES   MRN:      [id number]        Account:        [id number]   :      1980           Delivery Date:  2017      Document: [id number]

## 2017-08-23 NOTE — PLAN OF CARE
Up to bathroom with assistance.  Tolerated well.  Did take nap, but still exhausted.  Voided.  Pericares done.  Ice pack and tucks applied.  Pt taken per wc into NICU to see baby.

## 2017-08-23 NOTE — PLAN OF CARE
Problem: Goal Outcome Summary  Goal: Goal Outcome Summary  Outcome: No Change  VSS, ambulating to NICU for feedings this shift, gentamicin and ampicillin given for chorio, pain controlled with Tylenol and Ibuprofen, still experiencing pain but declining oxycodone, repositioned and offered donut ball, tucks and ice, up independently with no complaints of dizziness,  at bedside, reminded to call if wanting other intervention for pain management, will continue to monitor.

## 2017-08-24 VITALS
RESPIRATION RATE: 16 BRPM | BODY MASS INDEX: 29.81 KG/M2 | OXYGEN SATURATION: 99 % | TEMPERATURE: 97.7 F | DIASTOLIC BLOOD PRESSURE: 60 MMHG | HEIGHT: 62 IN | WEIGHT: 162 LBS | HEART RATE: 93 BPM | SYSTOLIC BLOOD PRESSURE: 92 MMHG

## 2017-08-24 LAB — COPATH REPORT: NORMAL

## 2017-08-24 PROCEDURE — 25000132 ZZH RX MED GY IP 250 OP 250 PS 637: Performed by: OBSTETRICS & GYNECOLOGY

## 2017-08-24 RX ORDER — IBUPROFEN 400 MG/1
400-800 TABLET, FILM COATED ORAL EVERY 6 HOURS PRN
Qty: 120 TABLET | COMMUNITY
Start: 2017-08-24

## 2017-08-24 RX ORDER — AMOXICILLIN 250 MG
1-2 CAPSULE ORAL 2 TIMES DAILY
Qty: 100 TABLET | COMMUNITY
Start: 2017-08-24

## 2017-08-24 RX ORDER — ACETAMINOPHEN 325 MG/1
650 TABLET ORAL EVERY 4 HOURS PRN
Qty: 100 TABLET | COMMUNITY
Start: 2017-08-24

## 2017-08-24 RX ADMIN — IBUPROFEN 800 MG: 400 TABLET ORAL at 08:30

## 2017-08-24 RX ADMIN — ACETAMINOPHEN 650 MG: 325 TABLET, FILM COATED ORAL at 02:19

## 2017-08-24 RX ADMIN — ACETAMINOPHEN 650 MG: 325 TABLET, FILM COATED ORAL at 08:30

## 2017-08-24 RX ADMIN — ACETAMINOPHEN 650 MG: 325 TABLET, FILM COATED ORAL at 14:43

## 2017-08-24 RX ADMIN — SENNOSIDES AND DOCUSATE SODIUM 1 TABLET: 8.6; 5 TABLET ORAL at 08:30

## 2017-08-24 RX ADMIN — IBUPROFEN 800 MG: 400 TABLET ORAL at 14:43

## 2017-08-24 RX ADMIN — IBUPROFEN 800 MG: 400 TABLET ORAL at 02:19

## 2017-08-24 NOTE — DISCHARGE SUMMARY
Physician Discharge Summary     Patient ID:  Dot Rahman  [id number]  37 year old  1980    Admit date: 2017    Discharge date and time: 2017     Admitting Physician: León Flores MD     Discharge Physician: Jennifer Schwab MD    Admission Diagnoses: pregnancy  Indication for care in labor or delivery  Indication for care in labor or delivery  Delivery normal    Discharge Diagnoses: PROM, normal vaginal delivery    Admission Condition: good    Discharged Condition: good    Indication for Admission: PROM at term    Hospital Course: Presented with PROM, cervix closed. Augmented with pitocin. Uncomplicated . Receieved antibiotics for chorioamnionitis.    Consults: none      Treatments: IV hydration and antibiotics and pitocin    Discharge Exam:  Normal    Disposition: home    Patient Instructions:   Current Discharge Medication List      START taking these medications    Details   acetaminophen (TYLENOL) 325 MG tablet Take 2 tablets (650 mg) by mouth every 4 hours as needed for mild pain or fever (greater than or equal to 38  C /100.4  F (oral) or 38.5  C/ 101.4  F (core).)  Qty: 100 tablet    Associated Diagnoses: Delivery normal      ibuprofen (ADVIL/MOTRIN) 400 MG tablet Take 1-2 tablets (400-800 mg) by mouth every 6 hours as needed for other (cramping)  Qty: 120 tablet    Associated Diagnoses: Delivery normal      senna-docusate (SENOKOT-S;PERICOLACE) 8.6-50 MG per tablet Take 1-2 tablets by mouth 2 times daily  Qty: 100 tablet    Associated Diagnoses: Delivery normal         CONTINUE these medications which have NOT CHANGED    Details   Prenatal Vit-Fe Fumarate-FA (PRENATAL MULTIVITAMIN PLUS IRON) 27-0.8 MG TABS per tablet Take 1 tablet by mouth daily           Activity: activity as tolerated and no sex for 6 weeks  Diet: regular diet    Follow-up with [facility] in 6 weeks.    Signed:  Jennifer L. Schwab  2017  7:42 AM

## 2017-08-24 NOTE — PLAN OF CARE
Problem: Goal Outcome Summary  Goal: Goal Outcome Summary  Outcome: Adequate for Discharge Date Met:  08/24/17  Ready for discharge home, understands instruction given.

## 2017-08-24 NOTE — PROGRESS NOTES
PPD 2  Doing well  Breastfeeding with some difficulty, received lactation support  Ready for discharge home  Follow up in 6 weeks postpartum    Jennifer L. Schwab

## 2017-08-24 NOTE — PLAN OF CARE
Problem: Goal Outcome Summary  Goal: Goal Outcome Summary  Outcome: Improving  BP low (76/40) at 1600 - pt denies lightheadedness/dizziness, encouraged PO intake, recheck BP at 2000 was 91/57, all other VSS. Fundus firm with appropriate vaginal flow. Pain managed well with tylenol & ibuprofen. Working on breastfeeding infant (attempts) pumping drops of colostrum. Up in room, voiding without difficulty. Independent with self cares, needing assist with latch. Receiving good support from family at bedside. Will continue to monitor.

## 2017-08-24 NOTE — PLAN OF CARE
Problem: Goal Outcome Summary  Goal: Goal Outcome Summary  Outcome: Improving  VSS, working on breastfeeding, baby fussy at breast for 0100 feed, offered shield, next attempt baby was sleepy at breast, talked about options of supplementing, parents decided to wait at this time, pain controlled with Tylenol and Ibuprofen, states satisfaction with pain management, up independently with no complaints of dizziness,  at bedside, interacting and bonding well with infant, will continue to monitor.

## 2017-08-24 NOTE — LACTATION NOTE
Follow up visit. Pt states infant has been sleepy for feedings. She's been pumping after feedings and getting drops of colostrum. Assisted pt to wake infant and latch her on. Infant eager to latch but seems unable to hold nipple in her mouth. Shield placed. Infant latched and suckled well for approx 3 mins, then was sleepy again. Pt states she felt a good strong latch which infant did not have with previous feedings. Demonstrated to pt how to hand express into a spoon and spoon feed infant. Infant then given approx 3 mls of EBM via spoon. Recommended pt then pump. Encouraged pt to continue putting infant skin to skin every 2-3 hours and working on breastfeeding, then pumping after each feed and giving infant any EBM she pumps or hand expresses if infant continues to be sleepy. Infant's weight loss is at 4% and she's had adequate voids and stools. Pt will discharge this evening. Discussed following up with LC at [facility] outpatient and continuing to document feeds, voids and stools on feeding log once at home.

## 2017-08-24 NOTE — ANESTHESIA POSTPROCEDURE EVALUATION
Patient: Dot Rahman    * No procedures listed *    Diagnosis:* No pre-op diagnosis entered *  Diagnosis Additional Information: No value filed.    Anesthesia Type:  No value filed.    Note:  Anesthesia Post Evaluation    Patient location during evaluation: bedside  Patient participation: Able to fully participate in evaluation  Level of consciousness: awake and awake and alert  Pain management: adequate  Airway patency: patent  Cardiovascular status: acceptable  Respiratory status: acceptable  Hydration status: acceptable  PONV: none     Anesthetic complications: None    Comments: Ambulating.  Denies HA, paresthesias or complications related to epidural.          Last vitals:  Vitals:    08/23/17 1100 08/23/17 1542 08/23/17 2049   BP: (!) 87/55 (!) 76/40 91/57   Pulse:      Resp: 16 16 16   Temp: 36.7  C (98  F) 36.7  C (98  F)    SpO2:            Electronically Signed By: Harshal Palma MD  August 23, 2017  9:48 PM